# Patient Record
Sex: MALE | Race: WHITE | NOT HISPANIC OR LATINO | Employment: FULL TIME | URBAN - NONMETROPOLITAN AREA
[De-identification: names, ages, dates, MRNs, and addresses within clinical notes are randomized per-mention and may not be internally consistent; named-entity substitution may affect disease eponyms.]

---

## 2020-12-17 ENCOUNTER — HOSPITAL ENCOUNTER (OUTPATIENT)
Facility: MEDICAL CENTER | Age: 25
End: 2020-12-17
Attending: NURSE PRACTITIONER
Payer: COMMERCIAL

## 2020-12-17 ENCOUNTER — OFFICE VISIT (OUTPATIENT)
Dept: URGENT CARE | Facility: PHYSICIAN GROUP | Age: 25
End: 2020-12-17
Payer: COMMERCIAL

## 2020-12-17 VITALS
HEART RATE: 93 BPM | SYSTOLIC BLOOD PRESSURE: 138 MMHG | TEMPERATURE: 98.5 F | WEIGHT: 181 LBS | HEIGHT: 68 IN | RESPIRATION RATE: 16 BRPM | OXYGEN SATURATION: 96 % | BODY MASS INDEX: 27.43 KG/M2 | DIASTOLIC BLOOD PRESSURE: 100 MMHG

## 2020-12-17 DIAGNOSIS — Z20.822 SUSPECTED COVID-19 VIRUS INFECTION: ICD-10-CM

## 2020-12-17 DIAGNOSIS — R11.2 NAUSEA AND VOMITING, INTRACTABILITY OF VOMITING NOT SPECIFIED, UNSPECIFIED VOMITING TYPE: ICD-10-CM

## 2020-12-17 PROCEDURE — 99203 OFFICE O/P NEW LOW 30 MIN: CPT | Mod: CS | Performed by: NURSE PRACTITIONER

## 2020-12-17 PROCEDURE — U0003 INFECTIOUS AGENT DETECTION BY NUCLEIC ACID (DNA OR RNA); SEVERE ACUTE RESPIRATORY SYNDROME CORONAVIRUS 2 (SARS-COV-2) (CORONAVIRUS DISEASE [COVID-19]), AMPLIFIED PROBE TECHNIQUE, MAKING USE OF HIGH THROUGHPUT TECHNOLOGIES AS DESCRIBED BY CMS-2020-01-R: HCPCS

## 2020-12-17 RX ORDER — ONDANSETRON 4 MG/1
4 TABLET, ORALLY DISINTEGRATING ORAL EVERY 6 HOURS PRN
Qty: 15 TAB | Refills: 0 | Status: SHIPPED | OUTPATIENT
Start: 2020-12-17

## 2020-12-17 ASSESSMENT — ENCOUNTER SYMPTOMS
HEADACHES: 0
COUGH: 1
CHILLS: 0
NAUSEA: 1
SHORTNESS OF BREATH: 0
FEVER: 0
WHEEZING: 0
MYALGIAS: 1
VOMITING: 1
SORE THROAT: 0
DIARRHEA: 0

## 2020-12-17 ASSESSMENT — PAIN SCALES - GENERAL: PAINLEVEL: NO PAIN

## 2020-12-18 DIAGNOSIS — Z20.822 SUSPECTED COVID-19 VIRUS INFECTION: ICD-10-CM

## 2020-12-18 DIAGNOSIS — R11.2 NAUSEA AND VOMITING, INTRACTABILITY OF VOMITING NOT SPECIFIED, UNSPECIFIED VOMITING TYPE: ICD-10-CM

## 2020-12-18 LAB — COVID ORDER STATUS COVID19: NORMAL

## 2020-12-18 NOTE — PROGRESS NOTES
"Subjective:   Kostas Castro is a 25 y.o. male who presents for Nausea (x2days ), Vomiting, and Body Aches      HPI  25 year old male in urgent care with new onset symptoms 2 days ago   COVID exposure: possible   OTC medications: has not taken anything OTC for symptoms     Review of Systems   Constitutional: Positive for malaise/fatigue. Negative for chills and fever.   HENT: Negative for sore throat.    Respiratory: Positive for cough. Negative for shortness of breath and wheezing.    Gastrointestinal: Positive for nausea and vomiting. Negative for diarrhea.   Musculoskeletal: Positive for myalgias.   Neurological: Negative for headaches.       There is no problem list on file for this patient.    History reviewed. No pertinent surgical history.  Social History     Tobacco Use   • Smoking status: Never Smoker   • Smokeless tobacco: Current User     Types: Chew   Substance Use Topics   • Alcohol use: Yes   • Drug use: Not Currently      History reviewed. No pertinent family history.   (Allergies, Medications, & Tobacco/Substance Use were reconciled by the Medical Assistant and reviewed by myself. The family history is prepopulated)     Objective:     /100   Pulse 93   Temp 36.9 °C (98.5 °F) (Temporal)   Resp 16   Ht 1.727 m (5' 8\")   Wt 82.1 kg (181 lb)   SpO2 96%   BMI 27.52 kg/m²     Physical Exam  Vitals signs reviewed.   HENT:      Right Ear: Tympanic membrane, ear canal and external ear normal.      Left Ear: Tympanic membrane, ear canal and external ear normal.      Nose: Nose normal.      Mouth/Throat:      Lips: Pink.      Mouth: Mucous membranes are moist.      Pharynx: Uvula midline.   Cardiovascular:      Rate and Rhythm: Normal rate and regular rhythm.      Heart sounds: Normal heart sounds.   Pulmonary:      Effort: Pulmonary effort is normal.      Breath sounds: Normal breath sounds.   Neurological:      Mental Status: He is alert and oriented to person, place, and time. "   Psychiatric:         Mood and Affect: Mood normal.         Behavior: Behavior normal.         Thought Content: Thought content normal.         Judgment: Judgment normal.         Assessment/Plan:     1. Suspected COVID-19 virus infection  COVID/SARS COV-2 PCR   2. Nausea and vomiting, intractability of vomiting not specified, unspecified vomiting type  COVID/SARS COV-2 PCR    ondansetron (ZOFRAN ODT) 4 MG TABLET DISPERSIBLE     Discussed Physical examination findings with patient are likely viral in etiology and could be due to COVID so will perform testing. Advised patient to remain in self isolation until cleared by a negative test or the health department, if they test positive. If exposed to COVID, advised to stay home for 14 days post exposure due to the possibility of developing symptoms day 2-14 post exposure. Will release results to [facility]. Strict ER precautions provided for worsening symptoms including SOB, difficulty breathing or high fever unable to be controlled by OTC tylenol or NSAIDS. Viral illness are largely self limiting and patient should increase fluids using electrolyte enriched beverages as well as OTC medications such as tylenol and ibuprofen per 's instructions.      zofran sent to preferred pharmacy for nausea    Appropriate PPE worn at all times by provider. Patient had face mask on for entirety of visit other than during oropharyngeal examination    Work note provided    Differential diagnosis, natural history, supportive care, and indications for immediate follow-up discussed.    Advised the patient to follow-up with the primary care physician for recheck, reevaluation, and consideration of further management.    Please note that this dictation was created using voice recognition software. I have made a reasonable attempt to correct obvious errors, but I expect that there are errors of grammar and possibly content that I did not discover before finalizing the note.    This  note was electronically signed VENTURA Rene

## 2020-12-19 LAB
SARS-COV-2 RNA RESP QL NAA+PROBE: NOTDETECTED
SPECIMEN SOURCE: NORMAL

## 2021-02-18 ENCOUNTER — HOSPITAL ENCOUNTER (EMERGENCY)
Dept: HOSPITAL 8 - ED | Age: 26
Discharge: HOME | End: 2021-02-18
Payer: COMMERCIAL

## 2021-02-18 VITALS — DIASTOLIC BLOOD PRESSURE: 99 MMHG | SYSTOLIC BLOOD PRESSURE: 139 MMHG

## 2021-02-18 VITALS — BODY MASS INDEX: 28.7 KG/M2 | HEIGHT: 68 IN | WEIGHT: 189.38 LBS
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
## 2021-02-18 DIAGNOSIS — F10.20: ICD-10-CM

## 2021-02-18 DIAGNOSIS — K02.9: Primary | ICD-10-CM

## 2021-02-18 DIAGNOSIS — K08.89: ICD-10-CM

## 2021-02-18 DIAGNOSIS — F17.210: ICD-10-CM

## 2021-02-18 DIAGNOSIS — Z90.89: ICD-10-CM

## 2021-02-18 PROCEDURE — 96361 HYDRATE IV INFUSION ADD-ON: CPT

## 2021-02-18 PROCEDURE — 99284 EMERGENCY DEPT VISIT MOD MDM: CPT

## 2021-02-18 PROCEDURE — 96375 TX/PRO/DX INJ NEW DRUG ADDON: CPT

## 2021-02-18 PROCEDURE — 99406 BEHAV CHNG SMOKING 3-10 MIN: CPT

## 2021-02-18 PROCEDURE — 96374 THER/PROPH/DIAG INJ IV PUSH: CPT

## 2021-02-18 NOTE — NUR
PT RESTING COMFORTABLY ON THE GURNEY. STATES THE NAUSEA IS GONE. NO VOMITING 
NOTED WHILE HE HAS BEEN HERE. FLUIDS ALMOST COMPLETE. CALL LIGHT WITHIN REACH. 
NO FURTHER NEEDS AT THIS TIME.

## 2021-09-18 ENCOUNTER — HOSPITAL ENCOUNTER (OUTPATIENT)
Facility: MEDICAL CENTER | Age: 26
End: 2021-09-18
Attending: PHYSICIAN ASSISTANT
Payer: COMMERCIAL

## 2021-09-18 ENCOUNTER — OFFICE VISIT (OUTPATIENT)
Dept: URGENT CARE | Facility: CLINIC | Age: 26
End: 2021-09-18
Payer: COMMERCIAL

## 2021-09-18 VITALS
BODY MASS INDEX: 26.52 KG/M2 | OXYGEN SATURATION: 99 % | WEIGHT: 175 LBS | RESPIRATION RATE: 14 BRPM | HEART RATE: 64 BPM | HEIGHT: 68 IN | SYSTOLIC BLOOD PRESSURE: 160 MMHG | TEMPERATURE: 97 F | DIASTOLIC BLOOD PRESSURE: 100 MMHG

## 2021-09-18 DIAGNOSIS — R43.9 DECREASED TASTE AND SMELL: ICD-10-CM

## 2021-09-18 DIAGNOSIS — R51.9 NONINTRACTABLE EPISODIC HEADACHE, UNSPECIFIED HEADACHE TYPE: ICD-10-CM

## 2021-09-18 PROCEDURE — 99213 OFFICE O/P EST LOW 20 MIN: CPT | Performed by: PHYSICIAN ASSISTANT

## 2021-09-18 PROCEDURE — U0005 INFEC AGEN DETEC AMPLI PROBE: HCPCS

## 2021-09-18 PROCEDURE — U0003 INFECTIOUS AGENT DETECTION BY NUCLEIC ACID (DNA OR RNA); SEVERE ACUTE RESPIRATORY SYNDROME CORONAVIRUS 2 (SARS-COV-2) (CORONAVIRUS DISEASE [COVID-19]), AMPLIFIED PROBE TECHNIQUE, MAKING USE OF HIGH THROUGHPUT TECHNOLOGIES AS DESCRIBED BY CMS-2020-01-R: HCPCS

## 2021-09-18 ASSESSMENT — ENCOUNTER SYMPTOMS
HEADACHES: 1
DIARRHEA: 0
FEVER: 0
VOMITING: 0
CHILLS: 0
NAUSEA: 0

## 2021-09-18 NOTE — PROGRESS NOTES
"Subjective:   Kostas Castro is a 25 y.o. male who presents for Other (4x days, headache, need a work note)        Patient presents for evaluation for COVID-19.    States that he had a transient episode of headaches and decreased taste that occurred on Wednesday.    He had been drinking alcohol the night before.  He believes symptoms may have been related to this.    His employer is requiring that he be tested for COVID-19.    He states that he is feeling much improved and denies cough, congestion, nausea, vomiting, fevers, chills.    No known Covid exposure.    He is not taking any medications for his symptoms.        Review of Systems   Constitutional: Negative for chills and fever.   Gastrointestinal: Negative for diarrhea, nausea and vomiting.   Neurological: Positive for headaches.       PMH:  has no past medical history on file.  MEDS:   Current Outpatient Medications:   •  ondansetron (ZOFRAN ODT) 4 MG TABLET DISPERSIBLE, Take 1 Tab by mouth every 6 hours as needed for Nausea. (Patient not taking: Reported on 9/18/2021), Disp: 15 Tab, Rfl: 0  ALLERGIES: No Known Allergies  SURGHX: History reviewed. No pertinent surgical history.  SOCHX:  reports that he has never smoked. His smokeless tobacco use includes chew. He reports current alcohol use. He reports previous drug use.  FH: Family history was reviewed, no pertinent findings to report   Objective:   /100 (BP Location: Left arm, Patient Position: Sitting, BP Cuff Size: Adult)   Pulse 64   Temp 36.1 °C (97 °F) (Temporal)   Resp 14   Ht 1.727 m (5' 8\")   Wt 79.4 kg (175 lb)   SpO2 99%   BMI 26.61 kg/m²   Physical Exam  Vitals reviewed.   Constitutional:       General: He is not in acute distress.     Appearance: Normal appearance. He is well-developed. He is not toxic-appearing.   HENT:      Head: Normocephalic and atraumatic.      Right Ear: External ear normal.      Left Ear: External ear normal.      Nose: Nose normal.   Eyes:      " General: Gaze aligned appropriately.   Cardiovascular:      Rate and Rhythm: Normal rate and regular rhythm.      Heart sounds: Normal heart sounds, S1 normal and S2 normal.   Pulmonary:      Effort: Pulmonary effort is normal. No respiratory distress.      Breath sounds: Normal breath sounds. No stridor. No decreased breath sounds, wheezing, rhonchi or rales.   Musculoskeletal:      Cervical back: Neck supple.   Skin:     General: Skin is warm and dry.      Capillary Refill: Capillary refill takes less than 2 seconds.   Neurological:      Mental Status: He is alert and oriented to person, place, and time.      Comments: CN2-12 grossly intact   Psychiatric:         Speech: Speech normal.         Behavior: Behavior normal.           Assessment/Plan:   1. Nonintractable episodic headache, unspecified headache type  - COVID/SARS CoV-2 PCR; Future    2. Decreased taste and smell  - COVID/SARS CoV-2 PCR; Future    Test for COVID-19. We will call/message back for results and appropriate further instructions. Instructed to sign up for [employer] if they have not already. Result will be released to [employer] application.   Symptomatic and supportive care:   Plenty of oral hydration and rest   Over the counter cough suppressant as directed.  Tylenol or ibuprofen for pain and fever as directed.   Saline nasal spray and Flonase as a decongestant.   Infection control measures at home. Stay away from people, Hand washing, covering sneeze/cough, disinfect surfaces.   Remain home from work, school, and other populated environments. Work note provided with information of quarantine measures and CDC guidelines.     Differential diagnosis, natural history, supportive care, and indications for immediate follow-up discussed.

## 2021-09-18 NOTE — LETTER
September 18, 2021    To Whom It May Concern:         This is confirmation that Kostas Castro attended his scheduled appointment with Fadi Dove P.A.-C. on 9/18/21. His testing for COVID-19 is pending.  He was instructed to quarantine in accordance with CDC guidelines.  He may return to work in accordance with CDC guidelines pending his testing results.         If you have any questions please do not hesitate to call me at the phone number listed below.    Sincerely,          Fadi Dove P.A.-C.  [phone]

## 2021-09-19 DIAGNOSIS — R43.9 DECREASED TASTE AND SMELL: ICD-10-CM

## 2021-09-19 DIAGNOSIS — R51.9 NONINTRACTABLE EPISODIC HEADACHE, UNSPECIFIED HEADACHE TYPE: ICD-10-CM

## 2021-09-19 LAB
COVID ORDER STATUS COVID19: NORMAL
SARS-COV-2 RNA RESP QL NAA+PROBE: NOTDETECTED
SPECIMEN SOURCE: NORMAL